# Patient Record
Sex: MALE | Race: WHITE | NOT HISPANIC OR LATINO | URBAN - METROPOLITAN AREA
[De-identification: names, ages, dates, MRNs, and addresses within clinical notes are randomized per-mention and may not be internally consistent; named-entity substitution may affect disease eponyms.]

---

## 2017-01-28 ENCOUNTER — EMERGENCY (EMERGENCY)
Facility: HOSPITAL | Age: 42
LOS: 1 days | End: 2017-01-28
Admitting: EMERGENCY MEDICINE
Payer: MEDICAID

## 2017-01-28 PROCEDURE — 99285 EMERGENCY DEPT VISIT HI MDM: CPT | Mod: 25

## 2017-01-29 PROCEDURE — 96374 THER/PROPH/DIAG INJ IV PUSH: CPT

## 2017-01-29 PROCEDURE — 81003 URINALYSIS AUTO W/O SCOPE: CPT

## 2017-01-29 PROCEDURE — 99285 EMERGENCY DEPT VISIT HI MDM: CPT | Mod: 25

## 2017-03-11 ENCOUNTER — EMERGENCY (EMERGENCY)
Facility: HOSPITAL | Age: 42
LOS: 1 days | Discharge: HOME | End: 2017-03-11
Attending: EMERGENCY MEDICINE | Admitting: EMERGENCY MEDICINE
Payer: SELF-PAY

## 2017-03-11 VITALS
TEMPERATURE: 98 F | HEART RATE: 56 BPM | DIASTOLIC BLOOD PRESSURE: 82 MMHG | OXYGEN SATURATION: 100 % | RESPIRATION RATE: 18 BRPM | SYSTOLIC BLOOD PRESSURE: 122 MMHG

## 2017-03-11 DIAGNOSIS — R41.82 ALTERED MENTAL STATUS, UNSPECIFIED: ICD-10-CM

## 2017-03-11 DIAGNOSIS — F10.129 ALCOHOL ABUSE WITH INTOXICATION, UNSPECIFIED: ICD-10-CM

## 2017-03-11 PROCEDURE — 99283 EMERGENCY DEPT VISIT LOW MDM: CPT

## 2017-03-11 NOTE — ED PROVIDER NOTE - OBJECTIVE STATEMENT
40 yo M w/ no pertinent PMHx BIBA for public EtOH intox. Pt was found at the [facility] on [address]. Pt admits to having heavy EtOH intake today.  Denies drug use or other illicits. No acute medical complaints or apparent trauma noted. No bleeding, respiratory distress, pain, vomiting, or urinary/bowel incontinence noted.

## 2017-03-11 NOTE — ED PROVIDER NOTE - DETAILS:
I have reviewed and agree with all pertinent clinical information, including history and physical exam and agree with treatment plan of the PA/NP

## 2017-03-11 NOTE — ED PROVIDER NOTE - NS ED MD SCRIBE ATTENDING SCRIBE SECTIONS
HISTORY OF PRESENT ILLNESS/HIV/REVIEW OF SYSTEMS/VITAL SIGNS( Pullset)/PHYSICAL EXAM/RESULTS/PROGRESS NOTE/CONSULTATIONS/SHIFT CHANGE/DISPOSITION/PAST MEDICAL/SURGICAL/SOCIAL HISTORY/INTAKE ASSESSMENT/SCREENINGS

## 2017-03-11 NOTE — ED PROVIDER NOTE - MEDICAL DECISION MAKING DETAILS
Patient BIBEMS for suspected alcohol intoxication. Observe to clinical sobriety.   PA STATEMENT: I personally performed the services described in the documentation, reviewed the documentation recorded by the scribe in my presence and it accurately and completely records my words and action. Patient BIBEMS for suspected alcohol intoxication. Observe to clinical sobriety.   At the time of discharge from the Emergency Department, the patient is alert with fluent appropriate speech and ambulatory without difficulty. A complete medical screening examination was performed and no emergency medical condition was identified.    PA STATEMENT: I personally performed the services described in the documentation, reviewed the documentation recorded by the scribe in my presence and it accurately and completely records my words and action.

## 2017-04-17 ENCOUNTER — EMERGENCY (EMERGENCY)
Facility: HOSPITAL | Age: 42
LOS: 1 days | Discharge: HOME | End: 2017-04-17
Attending: EMERGENCY MEDICINE
Payer: SELF-PAY

## 2017-04-17 VITALS
OXYGEN SATURATION: 95 % | DIASTOLIC BLOOD PRESSURE: 76 MMHG | HEART RATE: 114 BPM | WEIGHT: 210.1 LBS | HEIGHT: 70 IN | RESPIRATION RATE: 16 BRPM | SYSTOLIC BLOOD PRESSURE: 137 MMHG | TEMPERATURE: 98 F

## 2017-04-17 DIAGNOSIS — F10.10 ALCOHOL ABUSE, UNCOMPLICATED: ICD-10-CM

## 2017-04-17 DIAGNOSIS — I10 ESSENTIAL (PRIMARY) HYPERTENSION: ICD-10-CM

## 2017-04-17 PROCEDURE — 99284 EMERGENCY DEPT VISIT MOD MDM: CPT

## 2017-04-17 RX ORDER — HALOPERIDOL DECANOATE 100 MG/ML
5 INJECTION INTRAMUSCULAR ONCE
Refills: 0 | Status: COMPLETED | OUTPATIENT
Start: 2017-04-17 | End: 2017-04-17

## 2017-04-17 RX ADMIN — Medication 2 MILLIGRAM(S): at 21:40

## 2017-04-17 RX ADMIN — HALOPERIDOL DECANOATE 5 MILLIGRAM(S): 100 INJECTION INTRAMUSCULAR at 21:37

## 2017-04-17 NOTE — ED PROVIDER NOTE - CARDIAC, MLM
Normal rate, regular rhythm.  Heart sounds S1, S2.  No murmurs, rubs or gallops. Trace pitting edema b/l tachycardic, regular rhythm.  Heart sounds S1, S2.  No murmurs, rubs or gallops. Trace pitting edema b/l

## 2017-04-17 NOTE — ED PROVIDER NOTE - CONSTITUTIONAL, MLM
normal... Well appearing, AOB, disheveled, well nourished, awake, alert, and in no apparent distress. Pt w/ vomit in his beard, slurring his speech. AOB, disheveled,, awake, alert, and in no apparent distress. Pt w/ vomit in his beard, slurring his speech.

## 2017-04-17 NOTE — ED PROVIDER NOTE - PROGRESS NOTE DETAILS
pt attempted to get up, tried to adjust his oxygen, unsteady gait pt agitated, attempted to get out of bed again, pt unsafe, will sedate pt still intoxicated, asleep on stretcher. does not respond to name. will move extrem x 4 to painful stimuli, winces and moans with pain. will check CT head r/o ICH pt still sleeping. now arousable to name. will respond "what" when calling his name, opens eyes briefly. asked where he is, he mutters "hospital".  pt c improving responsiveness. will continue to observe until sober. seen moving all extremities spontaneuosly. breathing normally. lungs clear pt fully awake and alert, oriented x 3, coherent. has steady gait. no si/hi/hallucinations  I offered pt social work consult and referral to detox/rehab services. pt declining both at this point.

## 2017-04-17 NOTE — ED PROVIDER NOTE - OBJECTIVE STATEMENT
43 y/o M pt w/ PMHx of HTN, cirrhosis, heart failure, and EtOH abuse was BIB EMS to ED c/o EtOH intoxication today. Pt reports that he drank a lot today. Pt was found sitting in [employer] by EMS. Pt denies abd pain, fall, or any other complaints. NKDA. 41 y/o M pt w/ PMHx of HTN, cirrhosis, heart failure, and EtOH abuse was BIB EMS to ED c/o EtOH intoxication today. Pt reports that he drank a lot today. Pt was found sitting in [employer] by EMS. Pt denies abd pain, fall, or any other complaints. NKDA. Per patients belongings pt ama from outside hospital yestarday after being admitted for alcohol intox and chf. Pt denies any other complaints. Per paperwork, EF 40%

## 2017-04-17 NOTE — ED PROVIDER NOTE - MEDICAL DECISION MAKING DETAILS
43 y/o M pt w/ EtOH intoxication. Will get PO Zofran and hydration. Will observe for sobriety 41 y/o M pt w/ EtOH intoxication. Will observe for sobriety, no signs of trauma, and pt found sitting at [employer].

## 2017-04-18 VITALS
HEART RATE: 104 BPM | DIASTOLIC BLOOD PRESSURE: 83 MMHG | TEMPERATURE: 98 F | OXYGEN SATURATION: 100 % | RESPIRATION RATE: 16 BRPM | SYSTOLIC BLOOD PRESSURE: 137 MMHG

## 2017-04-18 PROCEDURE — 96372 THER/PROPH/DIAG INJ SC/IM: CPT

## 2017-04-18 PROCEDURE — 99284 EMERGENCY DEPT VISIT MOD MDM: CPT | Mod: 25

## 2017-04-18 PROCEDURE — 70450 CT HEAD/BRAIN W/O DYE: CPT | Mod: 26

## 2017-04-18 PROCEDURE — 36416 COLLJ CAPILLARY BLOOD SPEC: CPT

## 2017-04-18 PROCEDURE — 70450 CT HEAD/BRAIN W/O DYE: CPT

## 2017-09-01 ENCOUNTER — OUTPATIENT (OUTPATIENT)
Dept: OUTPATIENT SERVICES | Facility: HOSPITAL | Age: 42
LOS: 1 days | End: 2017-09-01
Payer: MEDICAID

## 2017-09-09 DIAGNOSIS — R69 ILLNESS, UNSPECIFIED: ICD-10-CM

## 2021-05-12 NOTE — ED PROVIDER NOTE - MUSCULOSKELETAL, MLM
89
Spine appears normal, range of motion is not limited, no muscle or joint tenderness, no gross signs of trauma

## 2022-03-28 NOTE — ED ADULT NURSE NOTE - RESPIRATORY ASSESSMENT
Patient's last EGD was in August of 2021  [facility] Biopsies were negative for [facility]'WDL

## 2022-07-02 NOTE — ED PROVIDER NOTE - DURATION
PHYSICAL MEDICINE AND REHABILITATION  INITIAL EVALUATION          Consults   Consulting Physician: Dr. Raissa Rahman  Referring Physician: Nestor Hackett MD  Other Physicians: Patient Care Team:  Nestor Hackett MD as PCP - General (Internal Medicine)    Reason for Consultation: Rehabilitation Recommendations    Chief Complaint: weakness    History was obtained from chart review and from patient.    History Of Present Illness  Patient is a 58 year old obese, left-handed male presenting with weakness after craniotomy.  Patient was admitted to [facility] with abnormal MRI.  He was treated for renal cell carcinoma status post nephrectomy.  he has been undergoing treatment for metastatic renal cancer under the direction of Dr. Carmichael.  Known metastasis to the cervical vertebrae and pelvis.  He has developed progressive increased confusion, weakness and difficulty with balance, headaches. MRI revealed a right frontal lobe mass with surrounding edema and underlying brain compression with mild shift.  He was started on dexamethasone and Keppra.  Mild leukocytosis felt secondary to steroids.  On June 29, 2022 he was taken to the OR by Molly Alvares and luz napoles for right frontal temporal craniotomy and resection of frontal lobe metastasis.  Postop course notable for hypertension, treated with IV hydralazine.  Noted to have hiccups, treated with Thorazine.  He developed acute kidney injury, has a history of chronic kidney disease stage III.  Therapies have been initiated, prior to this admission he was independent and working as a .  He lives alone.  He has limited support.  He has memory and cognitive deficits, deficits in his strength and balance, decreased insight.  Rehabilitation was consulted for recommendations      Past Medical History  Past Medical History:   Diagnosis Date   • At risk for sleep apnea    • Essential (primary) hypertension    • Gastroesophageal reflux disease    • Malignant  neoplasm (CMS/HCC)    • Renal mass, right          Surgical History  No past surgical history on file.    Family History:  No family history on file.      Social History  Patient  reports that he has never smoked. He has never used smokeless tobacco. He reports previous alcohol use. He reports that he does not use drugs.      Prior Living Situation:  Prior Living Situation  Information Provided By:: patient  Type of Home: House (brother's house)  Home Layout: Two level, Able to Live on Main level with bedroom/bathroom (pt plans to stay on main level at brother's house)  # Steps to Enter: 2  Rails to Enter: 2 - able to reach both at same time  Lives With: Alone (Patient plans to stay with brother and sister-in-law at [place])  Transportation: Drives  Bathroom Shower/Tub: Tub/Shower unit  Bathroom Toilet: Standard  Additional Comments: Brother might have AD if needed - patient owns no AD      Premorbid Functional Status: Independent  Prior Communication and Cognition:  Prior Communication/Cognition  Prior Cognition: Intact    Prior Level of Function:  Prior Function  Pre-Morbid Modified Cuming Scale: No symptoms at all  Prior ADL: Independent  Bed Mobility: Independent  Transfers: Independent  Toilet Transfers: Independent  Tub/Shower Transfers: Independent  Ambulation in the Home: Independent  Ambulation in the Community: Independent  Steps into the Home: Independent  Steps within the Home: Independent  Transfer Equipment: None  Locomotion Equipment: None  Car Transfers: Independent  History of Falls in past year: No  Prior Homemaking/IADLs: Independent  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[employer], [employer], computer)    Current Functional Status:       Supine to sit: independent    Sit to supine: independent  Transfers:       Sit to stand: independent    Stand to sit: independent  Gait/Ambulation:      Assistance: modified independent   Assistive device: gait belt and none    Distance (ft): 400     Pattern: step through    Type: excessive flexed posture, step through and increased nixon    Surface: even  Training Completed:    Tasks: gait training on level surfaces    Education details: patient safety    Pt challenged in all directions w sidestepping, walking backwards and mod ind. Pt walking faster than expected as if to complete gait task and return to room again all the while c/o that he's walked this floor \"five times\" on the first day he came to his room on the 4th floor. \"I wanted to walk this morning at 6:00 but none of the nurses would walk with me that early.\"  Stair Mobility:    Number of steps: 4;     Assistance: modified independent    Rails: bilateral rails    Pattern: reciprocal    Curb ambulation: modified independent  Training completed:    Tasks: stair training    Education details: patient safety    Patient understood to slow before approaching to perform stairs.  Pt c/o that he performed steps yesterday and why should he perform them again.     Transfers:    Assistive devices: gait belt    Sit to stand: stand by assist    Stand to sit: stand by assist     Functional Ambulation:    Assistance:stand by assist   Assistive device:gait belt    Distance (ft): 15;15     Activities of Daily Living (ADLs):  Eating:     Assist: independent  Grooming/Oral Hygiene:     Grooming assist: stand by assist    Position: standing at sink  Upper Body Dressing:    Assist: stand by assist  Lower Body Dressing:     Assist: stand by assist  Toilet transfer:     Assist: stand by assist  Toileting:     Assist: stand by assist  Bathing:     Assist: stand by assist (pt plans to sponge bathe at home)  Activities of daily living training:   Pt requires VCs for ADL completion d/t impaired problem solving and safety awareness          Allergies  ALLERGIES:  Patient has no known allergies.    Medications  Current Facility-Administered Medications   Medication   • heparin (porcine) injection 5,000 Units   • levETIRAcetam  (KepPRA) tablet 500 mg   • dexAMETHasone (DECADRON) tablet 4 mg   • [START ON 7/4/2022] dexAMETHasone (DECADRON) tablet 4 mg   • [START ON 7/7/2022] dexAMETHasone (DECADRON) tablet 2 mg   • pantoprazole (PROTONIX) EC tablet 40 mg   • ondansetron (ZOFRAN) injection 4 mg   • morphine injection 4 mg   • traMADol (ULTRAM) tablet 50 mg   • docusate sodium (COLACE) capsule 100 mg   • sodium chloride 0.9 % flush bag 25 mL   • sodium chloride (PF) 0.9 % injection 2 mL   • sodium chloride 0.9% infusion   • sodium chloride 0.9% infusion   • hydrALAZINE (APRESOLINE) injection 10 mg   • ALPRAZolam (XANAX) tablet 0.25 mg   • amLODIPine (NORVASC) tablet 2.5 mg   • chlorproMAZINE (THORAZINE) tablet 25 mg   • dextrose 50 % injection 25 g   • dextrose 50 % injection 12.5 g   • glucagon (GLUCAGEN) injection 1 mg   • dextrose (GLUTOSE) 40 % gel 15 g   • dextrose (GLUTOSE) 40 % gel 30 g   • insulin lispro (ADMELOG,HumaLOG) - Correction Dose   • sodium chloride 0.9% infusion   • sodium chloride 0.9% infusion   • potassium CHLORIDE (KLOR-CON) packet 40 mEq    And   • magnesium sulfate 2 g in 50 mL premix IVPB    And   • acetaminophen (TYLENOL) tablet 650 mg    And   • metoCLOPramide (REGLAN) injection 10 mg    And   • melatonin tablet 6 mg       Review of Systems  Review of Systems mild headache, no dizziness.  He was having problems with nausea.  He complains of poor sleep.  Denies shortness of breath or chest pain.  No diarrhea or constipation.    Last Recorded Vitals  Blood pressure 132/74, pulse 72, temperature 97.7 °F (36.5 °C), temperature source Oral, resp. rate 17, height 5' 9\" (1.753 m), weight 87 kg (191 lb 12.8 oz), SpO2 96 %.  Height and Weight  Height: 5' 9\" (175.3 cm)  Weight: 87 kg (191 lb 12.8 oz)  Estimated Dry Weight: 85 kg (187 lb 6.3 oz)  Dosing Weight: 85 kg (187 lb 6.3 oz)  BSA (Calculated - sq m): 2.03  BMI (Calculated): 28.32  Body mass index is 28.32 kg/m².      Physical Exam  Physical Exam obese male lying in  bed no acute distress, calm and cooperative, alert and oriented to person.  He knew the name of the hospital but had to look at the board for the correct date.  He had difficulty with serial sevens backwards.  He is able to spell world forward but not backwards.  Limited insight to his deficits  HEENT: Cranial dressing in place, wearing glasses  Cardiovascular: Regular rate and rhythm  Lungs: Clear to auscultation bilaterally, no wheezes  Abdomen: Soft, nontender  Extremities: No upper or lower extremity edema.  No calf tenderness  Vascular: Radial pulse are palpable  Motor: 4 - to 4-5 in the upper and lower extremities  Sensation: Intact in upper and lower extremities  Cerebellar: Slight hand tremor, Keisha slightly impaired left side  Musculoskeletal: No gross effusions noted.  Range of motion the upper and lower extremities within functional months.  Small joints otherwise not evidence joint laxity, contractures, effusions, crepitus or masses  Skin: Left upper extremity peripheral IV in place        Wound Documentation:  Wound Treatment and Goals (most recent)     Wound Treatment and Goals    No documentation.                 Diet:  Regular Diet    Labs:   No results displayed because visit has over 200 results.             Imaging:  LAST ECHO/ECHO STRESS:  No valid procedures specified.    LAST MRI:  === 06/22/22 ===    MRI BRAIN W WO CONTRAST    - Narrative -  PROCEDURE: MRI BRAIN W WO CONTRAST    CLINICAL INDICATION: Brain mass or lesion    TECHNIQUE: Multisequence and multiplanar MRI images of the brain were  obtained before and after administration of intravenous contrast.    CONTRAST: 19 mL MultiHance    COMPARISON: MRI brain 6/22/2022    FINDINGS:    In comparison to MRI brain dated 6/22/2022, interval right pterional  craniotomy postsurgical changes for resection of the right frontal lobe  mass seen on previous exam. Mild intrinsic patchy T1 hyperintense signal  along the resection cavity margins with  adjacent susceptibility artifact  related to evolving blood products/hemosiderin staining. No suspicious  nodular enhancement along the resection cavity margins.    There is extensive T2 FLAIR signal abnormality throughout the right  frontoparietal region extending towards the right external capsule, overall  similar to the preoperative exam. Similar degree of local mass effect with  effacement of the cortical sulci in the right hemisphere. Leftward  subfalcine herniation measuring up to 0.7 cm, previously 1.2 cm.    There is a right convexity subdural collection measuring up to 2-3 mm in  thickness. Mild right dural thickening and enhancement, likely evolving  postoperative changes.    Small amount of diffusion restriction along the margins of the right  frontal resection cavity, likely reflecting cytotoxic edema.    Partial reexpansion of the right lateral ventricle when compared to the  previous exam. Persistent partial effacement of the right lateral ventricle  and third ventricle.    Small remote infarcts in both cerebellar hemispheres.    Major intracranial flow voids are maintained.    Prominent retention cysts in both maxillary sinuses. Mild ethmoid air cell  mucosal thickening. Few scattered foci of fluid in the left mastoid air  cells. Right mastoid is clear.    - Impression -  1. In comparison to exam dated 6/22/2022, interval right pterional  craniotomy for resection of right frontal lobe mass with evolving  postsurgical changes at the resection site, as above. No focal residual  nodular enhancement is convincingly seen.  2. Diffuse FLAIR signal abnormality in the right frontoparietal parenchyma  with associated local mass effect, compatible with vasogenic edema  throughout this region, overall not significantly changed. Mild decrease in  the degree of leftward subfalcine herniation when compared to the  preoperative exam.  3. Thin right convexity subdural collection with mild dural enhancement,  likely  evolving postprocedural changes.  4. Remote infarcts in both cerebellar hemispheres.    Electronically Signed by: CARLOTTA ESXTON M.D.  Signed on: 6/30/2022 2:05 PM      === 06/22/22 ===    MRI BRAIN W WO CONTRAST    - Narrative -  PROCEDURE: MRI BRAIN W WO CONTRAST    CLINICAL INDICATION: Malignant neoplasm of the right bronchus/lung and  kidney.    TECHNIQUE: Multisequence and multiplanar MRI images of the brain were  obtained before and after administration of intravenous contrast.    CONTRAST: 20 mL MultiHance.    COMPARISON: PET/CT dated 10/13/2021.    FINDINGS:  There is a large solid and cystic mass in the right frontal lobe measuring  2.1 x 2.6 x 2.6 cm (AP x TV x CC).  This lesion demonstrates internal foci  of susceptibility artifact presumably reflecting hemorrhage.  There is  extensive vasogenic edema of the right frontal lobe resulting in a leftward  subfalcine and uncal herniation as well as leftward midline shift measuring  approximately 8 mm at the level of the septum pellucidum.  No acute  intracranial infarct.  No hydrocephalus.    Prominent bilateral mucous retention cyst in the maxillary sinuses.  The  mastoid air cells are clear.  The orbits are unremarkable.    The osseous calvarium and overlying scalp soft tissues are normal.    - Impression -  Large solid and cystic mass in the right frontal lobe measuring up to 2.6  cm results in extensive vasogenic edema resulting in leftward subfalcine  and uncal herniation as well as leftward midline shift measuring 8 mm.  Findings likely reflect metastasis given patient's known history of  malignancy.    Findings were relayed to Dr. Hernandez at on 06/22/2022 at 7:54 PM via  perfect serve.    Electronically Signed by: RAYRAY BASS M.D.  Signed on: 6/22/2022 7:55 PM    LAST CT:  === 06/22/22 ===    CT CHEST ABDOMEN PELVIS WO CONTRAST    - Narrative -  EXAM: CT CHEST ABDOMEN PELVIS WO CONTRAST    CLINICAL INDICATION: Urologic cancer.    COMPARISON: PET  October 2021  TECHNIQUE: Multiple axial images of the chest, abdomen and pelvis were  obtained without administration of contrast material.  In addition, coronal  and sagittal reformat images of the chest, abdomen and pelvis were  obtained. mA and/or kVp was adjusted for patient size.    FINDINGS: The study is limited due to lack of contrast.    Large paratracheal lymph node is seen measuring 3.8 x 3.6 cm.  this was  present on the previous PET/CT and now appears slightly smaller, decreased  from 4.5 x 4.6 cm on the previous PET/CT from 2021..  Evaluation for hilar  adenopathy is very limited on this noncontrast study.  Right hilar  adenopathy suspected.  This however has decreased since prior study 2001,  from 2.2 cm to 1.3 cm.  Aorta is normal in caliber.    The lungs demonstrate focal densities in the lower lobes.  These are new  since prior PET/CT.  No lung nodules are seen to suggest metastasis.  No  pleural effusions are noted.  There is no evidence of pneumothorax.    The liver shows small cysts in the left lobe.  These are unchanged since  prior study.  The spleen adrenal glands and pancreas are unremarkable.  Previously seen mass in the left adrenal glands has decreased from 4.4 x  6.5 cm, two 3.8 x 2.5 cm.  Patient is status post right nephrectomy.  There  is no retroperitoneal adenopathy.  Left kidney is unremarkable.  Bowel  loops show no bowel obstruction.  Appendix is normal.  No colitis or  diverticulitis is seen.  Densities are present in the right seminal  vesicles.  These were present on the previous study and are unchanged.  A  few fluid-filled small bowel loops are seen and enteritis may be present.    The bones show a bony lesion in the right ischial which is unchanged since  prior study.  No compression deformities are seen in the spine.    - Impression -  There is slight decrease in size of previously seen mediastinal  and right hilar adenopathy.  New densities are present in the lungs may  be  due to atelectasis versus infiltrate.  No infiltrates or pleural effusions  are seen.    There is interval decrease in size of left adrenal mass.  Patient is status  post right nephrectomy.  There is no hydronephrosis.  There is no bowel  obstruction or focal inflammatory changes.      Electronically Signed by: PREETHI SMITH MD  Signed on: 6/23/2022 4:59 PM      === 11/09/20 ===    CT CHEST ABDOMEN W CONTRAST    - Narrative -  EXAM: CT CHEST ABDOMEN W CONTRAST    CLINICAL INDICATION: Malignant neoplasm of right kidney.    COMPARISON: None.    TECHNIQUE: Contiguous axial CT images from the thoracic inlet to the iliac crests were acquired after 80 cc of Omnipaque-350 intravenous contrast infusion with additional sagittal and coronal reconstructions. Automated exposure control utilized.    FINDINGS:    CHEST:  The great vessels of the chest are unremarkable. Normal heart size without pericardial effusion.    Enlarged heterogenous right hilar lymph node measures 2.4 x 2.3 cm AP/TR and has central low-density suspicious for central necrosis. Enlarged left posterior hilar lymph node measures 1.9 x 2.6 cm AP/TR and also demonstrates central low-density  suspicious for central necrosis. No mediastinal or axillary lymphadenopathy by size criteria.    No focal airspace consolidation, pleural effusion or pneumothorax. 3 mm pulmonary nodule in the medial left upper lobe (series 3 image 29). 6 mm pulmonary nodule in the left upper lobe (series 3 image 31). 3 mm right upper lobe pulmonary nodule (series 3  image 27). 4 mm subpleural nodule posterior left upper lobe at the apex (series 3 image 17). 2 mm subpleural nodule lateral aspect left upper lobe (series 3 image 29).    ABDOMEN:  8 mm hypodense lesion in the left hepatic lobe and additional punctate lesions elsewhere in the liver are too small to characterize. No dense gallstones. The spleen and pancreas are unremarkable. Post surgical changes of prior right colectomy  and right  adrenalectomy. Heterogenous mass in the posterior left adrenal gland measures 4.5 x 3.6 cm AP/TR. Additional medial left-sided adrenal or periadrenal soft tissue nodule measures 1.2 x 1.1 cm AP/TR. The left kidney appears unremarkable.    There are no dilated loops of large or small bowel in the abdomen. The appendix is within normal limits.    Soft tissue nodule in the right abdomen measures 1.6 x 1.7 cm AP/TR (series 2 image 212).    Linear subcutaneous thickening along the right anterolateral abdominal wall suggestive of surgical scar.    No free air or fluid. No lymphadenopathy by size criteria.    No suspicious lytic or blastic osseous lesions.    - Impression -  1.   Enlarged necrotic bilateral hilar lymph nodes suspicious for metastatic lymphadenopathy.  2.   Few 6 mm and smaller bilateral upper lobe pulmonary nodules. Metastases cannot be excluded.  3.   Heterogenous 4.5 cm left adrenal mass and additional 1.2 cm adrenal or periadrenal soft tissue nodule, suspicious for metastases.  4.   Soft tissue nodule in the right abdomen measuring 1.7 cm, suspicious for a peritoneal metastatic implant.  5.   Several subcentimeter hypodense hepatic lesions are too small to characterize. Statistically, these most likely represent cysts or hemangiomas, although given malignancy history, attention on follow-up is advised.  6.   Alterations of prior right nephrectomy and adrenalectomy.    Electronically Signed by: REBECCA WALLS MD  Signed on: 11/10/2020 8:55 AM    LAST EKG:  No results found for this or any previous visit (from the past 4464 hour(s)).    LAST X-RAY:  === 06/22/22 ===    XR CERVICAL SPINE FLEXION EXTENSION ONLY 2 VIEWS    - Narrative -  EXAM: XR CERVICAL SPINE FLEXION EXTENSION ONLY 2 VIEWS    CLINICAL INDICATION: c3 metastasis    TECHNIQUE: Radiographs of the cervical spine.  Number of views: 3.    COMPARISON: PET/CT 10/13/2021. MRI brain 6/22/2022.    FINDINGS:    The bones are  today demineralized. Cervical vertebral bodies are normal in height  and alignment. No acute vertebral body collapse. No destructive osseous  lesion is identified within the limitations of this exam. No acute bony  erosion or suspicious periosteal reaction.    No significant spondylosis. Intervertebral disc spaces are relatively  maintained. No appreciable instability on flexion or extension views.  Atlantodental interval is normal.    - Impression -  1. No radiographically evident destructive osseous lesion in the cervical  spine.  2. Bone demineralization. No acute vertebral body collapse.    Electronically Signed by: CARLOTTA SEXTON M.D.  Signed on: 6/27/2022 11:38 AM      === 07/08/21 ===    XR HIP 2 VIEWS RIGHT    - Narrative -  EXAMINATION: XR HIP 2 VIEWS RIGHT.    CLINICAL INDICATION: History of cancer. Pain    COMPARISON: None.    Two views right hip were obtained.    There is mild increase spurs in the superior acetabulum.  Joint space is  maintained.  There is no evidence of fracture or dislocation and no bone  destruction is seen.    - Impression -  Mild degenerative changes.  No fracture.  No bone lesions are  appreciated.    Electronically Signed by: PREETHI SMITH MD  Signed on: 7/8/2021 4:11 PM    LAST U/S:  === 03/25/22 ===    US KIDNEY BILATERAL    - Narrative -  PROCEDURE: US KIDNEY BILATERAL    CLINICAL INDICATION: Malignant neoplasm of the right kidney.    TECHNIQUE: Multiple grayscale images of the kidneys and bladder were  obtained.    COMPARISON:  PET/CT dated October 13, 2021.    FINDINGS:    RIGHT KIDNEY: Surgically absent    LEFT KIDNEY: The left kidney is normal in size and echogenicity, measuring  13.4 cm in length.  No suspicious renal mass.  No shadowing calculus.  No  hydronephrosis.    URINARY BLADDER: The bladder is mildly distended and grossly unremarkable.  No ureteral jets are seen.    - Impression -  Normal sonographic appearance of the kidneys and urinary  bladder.    Electronically Signed by: RAYRAY BASS M.D.  Signed on: 3/25/2022 10:03 AM        Micro/Path:  Microbiology Results     None          Order Name Source Comment Collection Info Order Time   SURGICAL PATHOLOGY Brain Please call [id number] Collected By: Deon Thayer MD 6/29/2022  2:23 PM     How should test results be released to the patient's [employer] portal?   Automatic Release              ASSESSMENT/PLAN  Nontraumatic brain injury  Metastatic tumor to the right frontal lobe status post right frontal temporal craniotomy and resection of frontal lobe metastasis on June 29, 2022  Metastatic renal cell carcinoma with brain and bony metastasis.  Plan for repeat MRI later this month and to start stereotactic radiosurgical treatment.  Seizure prophylaxis.  He is at risk for seizures.  Plan to continue Keppra indefinitely  Impaired cognition and memory  DVT prophylaxis.  On heparin  Hiccups.  Continue Thorazine as needed  Impaired activities of daily living and mobility secondary to above    Principal Problem:    Neoplasm of brain causing mass effect on adjacent structures (CMS/HCC)        Code Status    Code Status: Full Resuscitation    Rehabilitation recommendations: Prior to this admission patient was independent, working as a , has limited support and lives alone.  His girlfriend is in [place].  Patient with decreased insight into his deficits but is able to recognize that his strength, body and mind are not where it he wants them to be.  He is anxious to get better and may benefit from a short stay on the acute inpatient rehabilitation unit for further physical and occupational therapy to maximize his endurance, strength, safety mobility and balance and transitioning to an outpatient neuro rehab program.  He will also benefit from speech therapy for cognition, memory, problem-solving, reasoning and higher level cognitive function.  He is agreeable to acute rehab, anticipate transfer once insurance  approval obtained.  Rehab admitting office contacted this afternoon    Per , Patient is part of [facility] and will likely not have out of network ines Rahman MD

## 2022-08-23 NOTE — ED PROVIDER NOTE - DIAGNOSIS COUNSELING, MDM
Lab Results   Component Value Date    5COL Yellow 08/23/2022    5UAPP Clear 08/23/2022    5UGLU Negative 08/23/2022    5UBILI Negative 08/23/2022    5UKET Negative 08/23/2022    5USPG 1.005 08/23/2022    5URBC Negative 08/23/2022    5UPH 7.5 08/23/2022    5UPROT Negative 08/23/2022    5UURP 0.2 08/23/2022    POCTUNITR Negative 08/23/2022    5UWBC Negative 08/23/2022      conducted a detailed discussion... I had a detailed discussion with the patient and/or guardian regarding the historical points, exam findings, and any diagnostic results supporting the discharge/admit diagnosis.

## 2022-10-04 NOTE — ED PROVIDER NOTE - NS ED NOTE AC HIGH RISK COUNTRIES
-------------------------------------------------------------------------------

            *** Note undone in EDM - 10/04/22 at 1403 by ALLIE ***             

-------------------------------------------------------------------------------

Patient ambulated to bathroom with slow gait with one person colten. Patient is 
unable to provide urine still. Another bag of normal saline bag infusing well. No

## 2024-02-27 NOTE — ED ADULT NURSE NOTE - ED NEURO NIH ASSESS
Resolved with magnesium sulfate 2 grams IV x 1 dose on 02/21/2024  Follow the magnesium level    Results from last 7 days   Lab Units 02/26/24  0723 02/25/24  0440 02/23/24  0833   MAGNESIUM mg/dL 2.1 2.0 2.1          within defined limits